# Patient Record
(demographics unavailable — no encounter records)

---

## 2024-10-11 NOTE — HISTORY OF PRESENT ILLNESS
[FreeTextEntry2] : Mihai is a now 18 year 1 month old male presenting for follow-up of severe vitamin D deficiency causing hypocalcemia.   He presented 12/8/23 due to due to severe hypocalcemia. He had hx of soreness of his muscles and weakness, in setting of being a picky eater, eating mainly carbohydrates, some fruits and fried chicken and taking no dairy. PCP results obtained showed calcium 5.4 mg/dL, vitamin D 7, with alkaline phosphatase in the 400's. Upon our receipt of blood work that was done 3 days before they faxed it, we recommended to family to present to ER immediately. Further history revealed very long hx of hypocalcemia as early as 7/2022. PCP office was called and discussed with them the danger of severe hypocalcemia. Calcium returned in ER at only 4.9 mg/dL, ALKP 388 U/L,  pg/mL, vitamin D 25 OH 7.6 ng/mL consistent with hypocalcemia from severe vitamin D deficiency. EKG showed prolonged QTc of 480. He was treated with a calcium bolus, oral treatment with calcium, vitamin D and calcitriol. He was admitted to ICU. It was several days until his symptoms started improving. Evaluated by adolescent for restrictive eating and also seen by GI. His length of hospitalization despite large dosages of calcium, calcitriol and vitamin D likely reflects significant hungry bone theater. After he was ultimately stabilized with calcium consistently in high 8's to low 9's he was cleared to go on home on calcium carbonate 1250mg chews (500mg elemental calcium per tab) Q8h 6 tablets three times a day = 3000 mg elemental calcium 3 times per day, calcitriol 1 mcg (2 tabs of 0.5 mcg each) with breakfast, 0.5mcg (1 tab) after school and 0.5mcg (1 tab) at bedtime. Vitamin D 4000 IU daily = 2 tablets of 2000 IU vit D daily with breakfast. With follow-up outpatient, his dosages was decreased steadily. Dr. Ramires last saw him in July, at which time he was not taking calcium very consistently (~2-3x/week). Labwork at that visit revealed he was once again Vitamin D deficienct with normal calcium and slightly high PTH. He was restarted on 4,000 IU daily of vitamin D for 2 months then 2,000 IU.  Mihai reports he is no longer on any medications. He has no headaches, vision issues, constipation, diarrhea, fatigue, sleeping difficulties, weakness, spasms. 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

## 2024-10-11 NOTE — HISTORY OF PRESENT ILLNESS
[FreeTextEntry2] : Mihai is a now 18 year 1 month old male presenting for follow-up of severe vitamin D deficiency causing hypocalcemia.   He presented 12/8/23 due to due to severe hypocalcemia. He had hx of soreness of his muscles and weakness, in setting of being a picky eater, eating mainly carbohydrates, some fruits and fried chicken and taking no dairy. PCP results obtained showed calcium 5.4 mg/dL, vitamin D 7, with alkaline phosphatase in the 400's. Upon our receipt of blood work that was done 3 days before they faxed it, we recommended to family to present to ER immediately. Further history revealed very long hx of hypocalcemia as early as 7/2022. PCP office was called and discussed with them the danger of severe hypocalcemia. Calcium returned in ER at only 4.9 mg/dL, ALKP 388 U/L,  pg/mL, vitamin D 25 OH 7.6 ng/mL consistent with hypocalcemia from severe vitamin D deficiency. EKG showed prolonged QTc of 480. He was treated with a calcium bolus, oral treatment with calcium, vitamin D and calcitriol. He was admitted to ICU. It was several days until his symptoms started improving. Evaluated by adolescent for restrictive eating and also seen by GI. His length of hospitalization despite large dosages of calcium, calcitriol and vitamin D likely reflects significant hungry bone theater. After he was ultimately stabilized with calcium consistently in high 8's to low 9's he was cleared to go on home on calcium carbonate 1250mg chews (500mg elemental calcium per tab) Q8h 6 tablets three times a day = 3000 mg elemental calcium 3 times per day, calcitriol 1 mcg (2 tabs of 0.5 mcg each) with breakfast, 0.5mcg (1 tab) after school and 0.5mcg (1 tab) at bedtime. Vitamin D 4000 IU daily = 2 tablets of 2000 IU vit D daily with breakfast. With follow-up outpatient, his dosages was decreased steadily. Dr. Ramires last saw him in July, at which time he was not taking calcium very consistently (~2-3x/week). Labwork at that visit revealed he was once again Vitamin D deficienct with normal calcium and slightly high PTH. He was restarted on 4,000 IU daily of vitamin D for 2 months then 2,000 IU.  Mihai reports he is no longer on any medications. He has no headaches, vision issues, constipation, diarrhea, fatigue, sleeping difficulties, weakness, spasms. 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

## 2024-10-11 NOTE — HISTORY OF PRESENT ILLNESS
[FreeTextEntry2] : Mihai is a now 18 year 1 month old male presenting for follow-up of severe vitamin D deficiency causing hypocalcemia.   He presented 12/8/23 due to due to severe hypocalcemia. He had hx of soreness of his muscles and weakness, in setting of being a picky eater, eating mainly carbohydrates, some fruits and fried chicken and taking no dairy. PCP results obtained showed calcium 5.4 mg/dL, vitamin D 7, with alkaline phosphatase in the 400's. Upon our receipt of blood work that was done 3 days before they faxed it, we recommended to family to present to ER immediately. Further history revealed very long hx of hypocalcemia as early as 7/2022. PCP office was called and discussed with them the danger of severe hypocalcemia. Calcium returned in ER at only 4.9 mg/dL, ALKP 388 U/L,  pg/mL, vitamin D 25 OH 7.6 ng/mL consistent with hypocalcemia from severe vitamin D deficiency. EKG showed prolonged QTc of 480. He was treated with a calcium bolus, oral treatment with calcium, vitamin D and calcitriol. He was admitted to ICU. It was several days until his symptoms started improving. Evaluated by adolescent for restrictive eating and also seen by GI. His length of hospitalization despite large dosages of calcium, calcitriol and vitamin D likely reflects significant hungry bone theater. After he was ultimately stabilized with calcium consistently in high 8's to low 9's he was cleared to go on home on calcium carbonate 1250mg chews (500mg elemental calcium per tab) Q8h 6 tablets three times a day = 3000 mg elemental calcium 3 times per day, calcitriol 1 mcg (2 tabs of 0.5 mcg each) with breakfast, 0.5mcg (1 tab) after school and 0.5mcg (1 tab) at bedtime. Vitamin D 4000 IU daily = 2 tablets of 2000 IU vit D daily with breakfast. With follow-up outpatient, his dosages was decreased steadily. Dr. Ramires last saw him in July, at which time he was not taking calcium very consistently (~2-3x/week). Labwork at that visit revealed he was once again Vitamin D deficienct with normal calcium and slightly high PTH. He was restarted on 4,000 IU daily of vitamin D for 2 months then 2,000 IU.  Mihai reports he is no longer on any medications. He has no headaches, vision issues, constipation, diarrhea, fatigue, sleeping difficulties, weakness, spasms. 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